# Patient Record
Sex: FEMALE | Race: BLACK OR AFRICAN AMERICAN | NOT HISPANIC OR LATINO | Employment: OTHER | URBAN - METROPOLITAN AREA
[De-identification: names, ages, dates, MRNs, and addresses within clinical notes are randomized per-mention and may not be internally consistent; named-entity substitution may affect disease eponyms.]

---

## 2017-10-05 ENCOUNTER — TRANSCRIBE ORDERS (OUTPATIENT)
Dept: ADMINISTRATIVE | Facility: HOSPITAL | Age: 57
End: 2017-10-05

## 2017-10-05 DIAGNOSIS — Z12.31 VISIT FOR SCREENING MAMMOGRAM: Primary | ICD-10-CM

## 2017-11-10 ENCOUNTER — HOSPITAL ENCOUNTER (OUTPATIENT)
Dept: MAMMOGRAPHY | Facility: HOSPITAL | Age: 57
Discharge: HOME | End: 2017-11-10
Admitting: NURSE PRACTITIONER

## 2017-11-10 DIAGNOSIS — Z12.31 VISIT FOR SCREENING MAMMOGRAM: ICD-10-CM

## 2017-11-10 PROCEDURE — 77063 BREAST TOMOSYNTHESIS BI: CPT | Performed by: RADIOLOGY

## 2017-11-10 PROCEDURE — 77063 BREAST TOMOSYNTHESIS BI: CPT

## 2017-11-10 PROCEDURE — G0202 SCR MAMMO BI INCL CAD: HCPCS

## 2017-11-10 PROCEDURE — 77067 SCR MAMMO BI INCL CAD: CPT | Performed by: RADIOLOGY

## 2018-10-04 ENCOUNTER — TRANSCRIBE ORDERS (OUTPATIENT)
Dept: ADMINISTRATIVE | Facility: HOSPITAL | Age: 58
End: 2018-10-04

## 2018-10-04 DIAGNOSIS — Z12.31 VISIT FOR SCREENING MAMMOGRAM: Primary | ICD-10-CM

## 2018-11-12 ENCOUNTER — HOSPITAL ENCOUNTER (OUTPATIENT)
Dept: MAMMOGRAPHY | Facility: HOSPITAL | Age: 58
Discharge: HOME | End: 2018-11-12
Admitting: INTERNAL MEDICINE

## 2018-11-12 DIAGNOSIS — Z12.31 VISIT FOR SCREENING MAMMOGRAM: ICD-10-CM

## 2018-11-12 PROCEDURE — 77063 BREAST TOMOSYNTHESIS BI: CPT

## 2018-11-12 PROCEDURE — 77067 SCR MAMMO BI INCL CAD: CPT | Performed by: RADIOLOGY

## 2018-11-12 PROCEDURE — 77063 BREAST TOMOSYNTHESIS BI: CPT | Performed by: RADIOLOGY

## 2018-11-12 PROCEDURE — 77067 SCR MAMMO BI INCL CAD: CPT

## 2019-01-28 ENCOUNTER — TRANSCRIBE ORDERS (OUTPATIENT)
Dept: NUTRITION | Facility: HOSPITAL | Age: 59
End: 2019-01-28

## 2019-01-28 DIAGNOSIS — E66.09 OBESITY DUE TO EXCESS CALORIES, UNSPECIFIED CLASSIFICATION, UNSPECIFIED WHETHER SERIOUS COMORBIDITY PRESENT: Primary | ICD-10-CM

## 2019-01-28 DIAGNOSIS — E11.9 DIABETES MELLITUS WITHOUT COMPLICATION (HCC): ICD-10-CM

## 2019-02-12 ENCOUNTER — HOSPITAL ENCOUNTER (OUTPATIENT)
Dept: NUTRITION | Facility: HOSPITAL | Age: 59
Setting detail: RECURRING SERIES
Discharge: HOME | End: 2019-02-12

## 2019-02-12 PROCEDURE — G0108 DIAB MANAGE TRN  PER INDIV: HCPCS | Performed by: DIETITIAN, REGISTERED

## 2019-03-13 ENCOUNTER — APPOINTMENT (OUTPATIENT)
Dept: NUTRITION | Facility: HOSPITAL | Age: 59
End: 2019-03-13

## 2019-03-22 ENCOUNTER — APPOINTMENT (OUTPATIENT)
Dept: NUTRITION | Facility: HOSPITAL | Age: 59
End: 2019-03-22

## 2020-07-01 ENCOUNTER — TRANSCRIBE ORDERS (OUTPATIENT)
Dept: ADMINISTRATIVE | Facility: HOSPITAL | Age: 60
End: 2020-07-01

## 2020-07-01 DIAGNOSIS — Z12.31 VISIT FOR SCREENING MAMMOGRAM: Primary | ICD-10-CM

## 2020-09-21 ENCOUNTER — HOSPITAL ENCOUNTER (OUTPATIENT)
Dept: MAMMOGRAPHY | Facility: HOSPITAL | Age: 60
Discharge: HOME | End: 2020-09-21
Admitting: NURSE PRACTITIONER

## 2020-09-21 DIAGNOSIS — Z12.31 VISIT FOR SCREENING MAMMOGRAM: ICD-10-CM

## 2020-09-21 PROCEDURE — 77063 BREAST TOMOSYNTHESIS BI: CPT | Performed by: RADIOLOGY

## 2020-09-21 PROCEDURE — 77067 SCR MAMMO BI INCL CAD: CPT

## 2020-09-21 PROCEDURE — 77063 BREAST TOMOSYNTHESIS BI: CPT

## 2020-09-21 PROCEDURE — 77067 SCR MAMMO BI INCL CAD: CPT | Performed by: RADIOLOGY

## 2021-04-26 ENCOUNTER — TRANSCRIBE ORDERS (OUTPATIENT)
Dept: ADMINISTRATIVE | Facility: HOSPITAL | Age: 61
End: 2021-04-26

## 2021-04-26 ENCOUNTER — HOSPITAL ENCOUNTER (OUTPATIENT)
Dept: GENERAL RADIOLOGY | Facility: HOSPITAL | Age: 61
Discharge: HOME | End: 2021-04-26
Admitting: INTERNAL MEDICINE

## 2021-04-26 DIAGNOSIS — M25.512 LEFT SHOULDER PAIN, UNSPECIFIED CHRONICITY: Primary | ICD-10-CM

## 2021-04-26 PROCEDURE — 73030 X-RAY EXAM OF SHOULDER: CPT

## 2021-05-24 ENCOUNTER — OFFICE VISIT (OUTPATIENT)
Dept: ORTHOPEDIC SURGERY | Facility: CLINIC | Age: 61
End: 2021-05-24

## 2021-05-24 VITALS
DIASTOLIC BLOOD PRESSURE: 82 MMHG | BODY MASS INDEX: 35.61 KG/M2 | HEIGHT: 68 IN | HEART RATE: 76 BPM | SYSTOLIC BLOOD PRESSURE: 134 MMHG | WEIGHT: 235 LBS

## 2021-05-24 DIAGNOSIS — M19.012 PRIMARY OSTEOARTHRITIS OF LEFT SHOULDER: Primary | ICD-10-CM

## 2021-05-24 PROCEDURE — 99204 OFFICE O/P NEW MOD 45 MIN: CPT | Performed by: ORTHOPAEDIC SURGERY

## 2021-05-24 RX ORDER — MESALAMINE 375 MG/1
CAPSULE, EXTENDED RELEASE ORAL
COMMUNITY
Start: 2021-05-03

## 2021-05-24 RX ORDER — INSULIN LISPRO 100 [IU]/ML
INJECTION, SOLUTION INTRAVENOUS; SUBCUTANEOUS
COMMUNITY
Start: 2021-05-09

## 2021-05-24 RX ORDER — SIMVASTATIN 40 MG
TABLET ORAL
COMMUNITY
Start: 2021-04-30

## 2021-05-24 RX ORDER — HYDROCODONE BITARTRATE AND ACETAMINOPHEN 10; 325 MG/1; MG/1
TABLET ORAL
COMMUNITY
Start: 2021-04-26

## 2021-05-24 RX ORDER — AMLODIPINE BESYLATE 5 MG/1
TABLET ORAL
COMMUNITY
Start: 2021-04-29

## 2021-05-24 RX ORDER — METHOCARBAMOL 750 MG/1
TABLET, FILM COATED ORAL
COMMUNITY
Start: 2021-04-15

## 2021-05-24 RX ORDER — TRIAMCINOLONE ACETONIDE 0.25 MG/G
CREAM TOPICAL 2 TIMES DAILY
COMMUNITY

## 2021-05-24 RX ORDER — METOPROLOL TARTRATE 100 MG/1
TABLET ORAL
COMMUNITY
Start: 2021-05-03

## 2021-05-24 RX ORDER — PREGABALIN 150 MG/1
CAPSULE ORAL
COMMUNITY
Start: 2021-05-07

## 2021-05-24 RX ORDER — DONEPEZIL HYDROCHLORIDE 10 MG/1
TABLET, FILM COATED ORAL
COMMUNITY
Start: 2021-04-05

## 2021-05-24 RX ORDER — EXENATIDE 2 MG/.65ML
INJECTION, SUSPENSION, EXTENDED RELEASE SUBCUTANEOUS
COMMUNITY
Start: 2021-03-16 | End: 2021-10-19

## 2021-05-24 RX ORDER — FLUCONAZOLE 150 MG/1
TABLET ORAL
COMMUNITY
Start: 2021-05-03

## 2021-05-24 RX ORDER — DULOXETIN HYDROCHLORIDE 60 MG/1
CAPSULE, DELAYED RELEASE ORAL
COMMUNITY
Start: 2021-05-03

## 2021-05-24 RX ORDER — BUSPIRONE HYDROCHLORIDE 10 MG/1
TABLET ORAL
COMMUNITY
Start: 2021-04-03

## 2021-05-24 RX ORDER — ESTRADIOL 0.1 MG/D
FILM, EXTENDED RELEASE TRANSDERMAL
COMMUNITY
Start: 2021-04-15

## 2021-05-24 RX ORDER — ASPIRIN 81 MG/1
81 TABLET ORAL DAILY
COMMUNITY

## 2021-05-24 RX ORDER — OMEPRAZOLE 40 MG/1
CAPSULE, DELAYED RELEASE ORAL
COMMUNITY
Start: 2021-05-03

## 2021-05-24 RX ORDER — YOHIMBE BARK 500 MG
350 CAPSULE ORAL
COMMUNITY

## 2021-05-24 RX ORDER — INSULIN GLARGINE 100 [IU]/ML
INJECTION, SOLUTION SUBCUTANEOUS
COMMUNITY
Start: 2021-03-10

## 2021-05-24 NOTE — PROGRESS NOTES
"    [facility] Note    Subjective     Chief Complaint   Patient presents with   • Left Shoulder - Pain        HPI    Lizeth Johns is a 61 y.o. female who presents with new problem of     The patient is right-hand dominant. The patient reports intermittent left shoulder pain for approximately 2 years and she is currently in pain today. She localizes her pain to the posterior aspect to the anterior aspect of the shoulder. She denies any known injury and reports pain with lifting and lying on her left side with intermittent stiffness. She also reports pain as she is currently sitting her and it feels as if it has a \"pull\" in it. She denies currently having any numbness or tingling in her left upper extremity.     The patient has tried a muscle relaxer and hydrocodone secondary to her lower back issues, which does not seem to alleviate any of her shoulder pain. She is currently attending physical therapy, which provides relief until she leaves physical therapy for 1 hour. The patient denies any previous injections in her left shoulder.    I have reviewed the following portions of the patient's history and agree with: History of Present Illness and Review of Systems    There is no problem list on file for this patient.    Past Medical History:   Diagnosis Date   • Diabetes (CMS/HCC)    • Fibromyalgia    • Hypertension    • Osteoarthritis       Past Surgical History:   Procedure Laterality Date   • BACK SURGERY     • BLADDER SURGERY     • CATARACT EXTRACTION     • CYST REMOVAL     • ENDOMETRIAL ABLATION     • GALLBLADDER SURGERY        Family History   Problem Relation Age of Onset   • Ovarian cancer Sister         DX AGE UNKNOWN   • Ovarian cancer Maternal Aunt         DX AGE UNKNOWN   • Ovarian cancer Mother    • Breast cancer Neg Hx      Social History     Socioeconomic History   • Marital status:      Spouse name: Not on file   • Number of children: Not on file   • Years of education: " Not on file   • Highest education level: Not on file   Tobacco Use   • Smoking status: Former Smoker     Types: Cigarettes   • Smokeless tobacco: Never Used   Substance and Sexual Activity   • Alcohol use: Never   • Drug use: Never   • Sexual activity: Defer      Current Outpatient Medications on File Prior to Visit   Medication Sig Dispense Refill   • amLODIPine (NORVASC) 5 MG tablet      • Apriso 0.375 g 24 hr capsule      • aspirin 81 MG EC tablet Take 81 mg by mouth Daily.     • busPIRone (BUSPAR) 10 MG tablet      • Bydureon 2 MG pen-injector injection      • Diclofenac Sodium (VOLTAREN) 1 % gel gel      • donepezil (ARICEPT) 10 MG tablet      • DULoxetine (CYMBALTA) 60 MG capsule      • estradiol (VIVELLE-DOT) 0.1 MG/24HR patch      • HumaLOG KwikPen 100 UNIT/ML solution pen-injector      • HYDROcodone-acetaminophen (NORCO)  MG per tablet      • Insulin Glargine (BASAGLAR KWIKPEN) 100 UNIT/ML injection pen      • Lactobacillus (Acidophilus) 100 MG capsule Take 350 mg by mouth.     • magnesium oxide (MAGOX) 400 (241.3 Mg) MG tablet tablet      • methocarbamol (ROBAXIN) 750 MG tablet      • metoprolol tartrate (LOPRESSOR) 100 MG tablet      • omeprazole (priLOSEC) 40 MG capsule      • pregabalin (LYRICA) 150 MG capsule      • progesterone (PROMETRIUM) 100 MG capsule      • simvastatin (ZOCOR) 40 MG tablet      • triamcinolone (KENALOG) 0.025 % cream Apply  topically to the appropriate area as directed 2 (Two) Times a Day.     • fluconazole (DIFLUCAN) 150 MG tablet        No current facility-administered medications on file prior to visit.      Allergies   Allergen Reactions   • Bactrim [Sulfamethoxazole-Trimethoprim] Hives   • Flavoxate Hives   • Lisinopril Swelling   • Phenazopyridine Hives        Review of Systems   Constitutional: Negative.  Negative for activity change, appetite change, chills, diaphoresis, fatigue, fever and unexpected weight change.   HENT: Negative.  Negative for congestion, dental  "problem, drooling, ear discharge, ear pain, facial swelling, hearing loss, mouth sores, nosebleeds, postnasal drip, rhinorrhea, sinus pressure, sneezing, sore throat, tinnitus, trouble swallowing and voice change.    Eyes: Negative.  Negative for photophobia, pain, discharge, redness, itching and visual disturbance.   Respiratory: Negative.  Negative for apnea, cough, choking, chest tightness, shortness of breath, wheezing and stridor.    Cardiovascular: Negative.  Negative for chest pain, palpitations and leg swelling.   Gastrointestinal: Negative.  Negative for abdominal distention, abdominal pain, anal bleeding, blood in stool, constipation, diarrhea, nausea, rectal pain and vomiting.   Endocrine: Negative.  Negative for cold intolerance, heat intolerance, polydipsia, polyphagia and polyuria.   Genitourinary: Negative.  Negative for decreased urine volume, difficulty urinating, dysuria, enuresis, flank pain, frequency, genital sores, hematuria and urgency.   Musculoskeletal: Positive for arthralgias. Negative for back pain, gait problem, joint swelling, myalgias, neck pain and neck stiffness.   Skin: Negative.  Negative for color change, pallor, rash and wound.   Allergic/Immunologic: Negative.  Negative for environmental allergies, food allergies and immunocompromised state.   Neurological: Negative.  Negative for dizziness, tremors, seizures, syncope, facial asymmetry, speech difficulty, weakness, light-headedness, numbness and headaches.   Hematological: Negative.  Negative for adenopathy. Does not bruise/bleed easily.   Psychiatric/Behavioral: Negative.  Negative for agitation, behavioral problems, confusion, decreased concentration, dysphoric mood, hallucinations, self-injury, sleep disturbance and suicidal ideas. The patient is not nervous/anxious and is not hyperactive.         Objective      Physical Exam  /82   Pulse 76   Ht 172.7 cm (68\")   Wt 107 kg (235 lb)   BMI 35.73 kg/m²     Body mass " index is 35.73 kg/m².    General:   Mental Status:  Alert   Appearance: Cooperative, in no acute distress   Build and Nutrition: Obese by BMI female   Orientation: Alert and oriented to person, place and time   Posture: Normal   Gait: Normal    Integument  • Left shoulder: No skin lesions, rash, or ecchymosis.    Neurologic  • Sensation:  • Left hand: Intact to light touch in the digits    Motor  • Left upper extremity: 5/5 deltoid, biceps, triceps, wrist flexors, wrist extensors, and interossei    Vascular  • Left upper extremity: 2+ radial pulse. Prompt capillary refill.    Upper Extremities  • Left Shoulder:  • Tenderness: None  • Swelling: None  • Crepitus: None  • Atrophy: None  • Range of motion:  • External rotation: 80°  • Forward flexion: 170°  • Abduction: 120°  • Instability: None  • Deformities: None  • Functional Testing:  • Drop Arm: Negative  • Lift off: Negative.  • Impingement: Positive    Imaging/Studies    XR Shoulder 2+ View Left (04/26/2021 11:53)    I personally reviewed the patient's left shoulder radiographs that were performed from 04/26/2020. These demonstrated signs of glenohumeral degeneration. No acute bony abnormalities.    Imaging Results (Last 24 Hours)     ** No results found for the last 24 hours. **          Assessment and Plan     Diagnoses and all orders for this visit:    1. Primary osteoarthritis of left shoulder (Primary)  -     MRI Shoulder Left Without Contrast; Future        1. Primary osteoarthritis of left shoulder        I reviewed my findings with the patient today.  Her shoulder has not responded to medication and physical therapy, therefore I am going to order an MRI of the left shoulder for further evaluation.  I will see her back after the MRI to review the findings and make further recommendations and discuss treatment options.      Return for After Imaging Study.      Scribed for Jerome Egan MD by Vineet Greene.  05/24/21   11:02 EDT    I have personally  performed the services described in this document as scribed by the above individual, and it is both accurate and complete.  Jerome Egan MD  5/24/2021  12:38 EDT

## 2021-06-25 ENCOUNTER — TRANSCRIBE ORDERS (OUTPATIENT)
Dept: ADMINISTRATIVE | Facility: HOSPITAL | Age: 61
End: 2021-06-25

## 2021-06-25 DIAGNOSIS — Z12.31 VISIT FOR SCREENING MAMMOGRAM: Primary | ICD-10-CM

## 2021-07-20 ENCOUNTER — HOSPITAL ENCOUNTER (OUTPATIENT)
Dept: MRI IMAGING | Facility: HOSPITAL | Age: 61
Discharge: HOME | End: 2021-07-20

## 2021-07-20 DIAGNOSIS — M19.012 PRIMARY OSTEOARTHRITIS OF LEFT SHOULDER: ICD-10-CM

## 2021-07-22 ENCOUNTER — TELEPHONE (OUTPATIENT)
Dept: ORTHOPEDIC SURGERY | Facility: CLINIC | Age: 61
End: 2021-07-22

## 2021-07-22 DIAGNOSIS — M19.012 PRIMARY OSTEOARTHRITIS OF LEFT SHOULDER: Primary | ICD-10-CM

## 2021-08-17 ENCOUNTER — HOSPITAL ENCOUNTER (OUTPATIENT)
Dept: GENERAL RADIOLOGY | Facility: HOSPITAL | Age: 61
Discharge: HOME | End: 2021-08-17

## 2021-08-17 ENCOUNTER — HOSPITAL ENCOUNTER (OUTPATIENT)
Dept: MRI IMAGING | Facility: HOSPITAL | Age: 61
End: 2021-08-17

## 2021-08-17 ENCOUNTER — HOSPITAL ENCOUNTER (OUTPATIENT)
Dept: CT IMAGING | Facility: HOSPITAL | Age: 61
Discharge: HOME | End: 2021-08-17

## 2021-08-17 DIAGNOSIS — M19.012 PRIMARY OSTEOARTHRITIS OF LEFT SHOULDER: ICD-10-CM

## 2021-08-17 PROCEDURE — 77002 NEEDLE LOCALIZATION BY XRAY: CPT

## 2021-08-17 PROCEDURE — 25010000002 IOPAMIDOL 61 % SOLUTION: Performed by: ORTHOPAEDIC SURGERY

## 2021-08-17 PROCEDURE — 73201 CT UPPER EXTREMITY W/DYE: CPT

## 2021-08-17 RX ORDER — LIDOCAINE HYDROCHLORIDE 10 MG/ML
5 INJECTION, SOLUTION EPIDURAL; INFILTRATION; INTRACAUDAL; PERINEURAL ONCE
Status: COMPLETED | OUTPATIENT
Start: 2021-08-17 | End: 2021-08-17

## 2021-08-17 RX ADMIN — LIDOCAINE HYDROCHLORIDE 5 ML: 10 INJECTION, SOLUTION EPIDURAL; INFILTRATION; INTRACAUDAL; PERINEURAL at 14:05

## 2021-08-17 RX ADMIN — IOPAMIDOL 20 ML: 612 INJECTION, SOLUTION INTRAVENOUS at 14:05

## 2021-08-23 ENCOUNTER — OFFICE VISIT (OUTPATIENT)
Dept: ORTHOPEDIC SURGERY | Facility: CLINIC | Age: 61
End: 2021-08-23

## 2021-08-23 VITALS
DIASTOLIC BLOOD PRESSURE: 96 MMHG | BODY MASS INDEX: 34.36 KG/M2 | WEIGHT: 232 LBS | HEART RATE: 78 BPM | SYSTOLIC BLOOD PRESSURE: 143 MMHG | HEIGHT: 69 IN

## 2021-08-23 DIAGNOSIS — M19.012 PRIMARY OSTEOARTHRITIS OF LEFT SHOULDER: Primary | ICD-10-CM

## 2021-08-23 PROCEDURE — 99214 OFFICE O/P EST MOD 30 MIN: CPT | Performed by: ORTHOPAEDIC SURGERY

## 2021-08-23 NOTE — PROGRESS NOTES
[facility]    Subjective     Chief Complaint   Patient presents with   • Left Shoulder - Follow-up     3 month f/u, Post CT Arthrogram 08/17/21        HPI    Lizeth Johns is a 61 y.o. female who follows up for left shoulder. She had a CT arthrogram and is here today for the results. She has been having problems with the shoulder for 2 years now, though more noticeably within the last year. She has tried physical therapy, which helped only during her sessions.    The patient reports her left shoulder pain has been worsening since her last visit. She is in a significant amount of pain and discomfort and has tingling in her hands. She has not had any injections in her shoulder previously.      I have reviewed the following portions of the patient's history and agree with: History of Present Illness and Review of Systems    There is no problem list on file for this patient.    Past Medical History:   Diagnosis Date   • Diabetes (CMS/HCC)    • Fibromyalgia    • Hypertension    • Osteoarthritis       Past Surgical History:   Procedure Laterality Date   • BACK SURGERY     • BLADDER SURGERY     • CATARACT EXTRACTION     • CYST REMOVAL     • ENDOMETRIAL ABLATION     • GALLBLADDER SURGERY        Family History   Problem Relation Age of Onset   • Ovarian cancer Sister         DX AGE UNKNOWN   • Ovarian cancer Maternal Aunt         DX AGE UNKNOWN   • Ovarian cancer Mother    • Breast cancer Neg Hx      Social History     Socioeconomic History   • Marital status:      Spouse name: Not on file   • Number of children: Not on file   • Years of education: Not on file   • Highest education level: Not on file   Tobacco Use   • Smoking status: Former Smoker     Types: Cigarettes   • Smokeless tobacco: Never Used   Substance and Sexual Activity   • Alcohol use: Never   • Drug use: Never   • Sexual activity: Defer      Current Outpatient Medications on File Prior to Visit   Medication Sig Dispense  Refill   • amLODIPine (NORVASC) 5 MG tablet      • Apriso 0.375 g 24 hr capsule      • aspirin 81 MG EC tablet Take 81 mg by mouth Daily.     • busPIRone (BUSPAR) 10 MG tablet      • Bydureon 2 MG pen-injector injection      • Diclofenac Sodium (VOLTAREN) 1 % gel gel      • donepezil (ARICEPT) 10 MG tablet      • DULoxetine (CYMBALTA) 60 MG capsule      • estradiol (VIVELLE-DOT) 0.1 MG/24HR patch      • fluconazole (DIFLUCAN) 150 MG tablet      • HumaLOG KwikPen 100 UNIT/ML solution pen-injector      • HYDROcodone-acetaminophen (NORCO)  MG per tablet      • Insulin Glargine (BASAGLAR KWIKPEN) 100 UNIT/ML injection pen      • Lactobacillus (Acidophilus) 100 MG capsule Take 350 mg by mouth.     • magnesium oxide (MAGOX) 400 (241.3 Mg) MG tablet tablet      • methocarbamol (ROBAXIN) 750 MG tablet      • metoprolol tartrate (LOPRESSOR) 100 MG tablet      • omeprazole (priLOSEC) 40 MG capsule      • pregabalin (LYRICA) 150 MG capsule      • progesterone (PROMETRIUM) 100 MG capsule      • simvastatin (ZOCOR) 40 MG tablet      • triamcinolone (KENALOG) 0.025 % cream Apply  topically to the appropriate area as directed 2 (Two) Times a Day.       No current facility-administered medications on file prior to visit.      Allergies   Allergen Reactions   • Bactrim [Sulfamethoxazole-Trimethoprim] Hives   • Flavoxate Hives   • Lisinopril Swelling   • Phenazopyridine Hives        Review of Systems   Constitutional: Negative.  Negative for activity change, appetite change, chills, diaphoresis, fatigue, fever and unexpected weight change.   HENT: Negative.  Negative for congestion, dental problem, drooling, ear discharge, ear pain, facial swelling, hearing loss, mouth sores, nosebleeds, postnasal drip, rhinorrhea, sinus pressure, sneezing, sore throat, tinnitus, trouble swallowing and voice change.    Eyes: Negative.  Negative for photophobia, pain, discharge, redness, itching and visual disturbance.   Respiratory: Negative.   "Negative for apnea, cough, choking, chest tightness, shortness of breath, wheezing and stridor.    Cardiovascular: Negative.  Negative for chest pain, palpitations and leg swelling.   Gastrointestinal: Negative.  Negative for abdominal distention, abdominal pain, anal bleeding, blood in stool, constipation, diarrhea, nausea, rectal pain and vomiting.   Endocrine: Negative.  Negative for cold intolerance, heat intolerance, polydipsia, polyphagia and polyuria.   Genitourinary: Negative.  Negative for decreased urine volume, difficulty urinating, dysuria, enuresis, flank pain, frequency, genital sores, hematuria and urgency.   Musculoskeletal: Positive for arthralgias. Negative for back pain, gait problem, joint swelling, myalgias, neck pain and neck stiffness.   Skin: Negative.  Negative for color change, pallor, rash and wound.   Allergic/Immunologic: Negative.  Negative for environmental allergies, food allergies and immunocompromised state.   Neurological: Negative.  Negative for dizziness, tremors, seizures, syncope, facial asymmetry, speech difficulty, weakness, light-headedness, numbness and headaches.   Hematological: Negative.  Negative for adenopathy. Does not bruise/bleed easily.   Psychiatric/Behavioral: Negative.  Negative for agitation, behavioral problems, confusion, decreased concentration, dysphoric mood, hallucinations, self-injury, sleep disturbance and suicidal ideas. The patient is not nervous/anxious and is not hyperactive.         Objective      Physical Exam  /96   Pulse 78   Ht 175.3 cm (69.02\")   Wt 105 kg (232 lb)   BMI 34.24 kg/m²     Body mass index is 34.24 kg/m².    General:   Mental Status:  Alert   Appearance: Cooperative, in no acute distress   Build and Nutrition: Obese by BMI female   Orientation: Alert and oriented to person, place and time   Posture: Normal   Gait: Normal    Integument:  • Left shoulder: No skin lesions, rash, or ecchymosis.    Upper Extremities  • Left " Shoulder:  • Tenderness: None  • Swelling: None  • Range of motion:  • External rotation: 70°  • Forward flexion: 100°  • Abduction: 80°  • Forward elevation: active to 100°, passive to 120°  • Deformities: None  • Functional Testing:  • Drop Arm: Negative  • Lift off: Negative.  • Impingement:    Imaging/Studies  Imaging Results (Last 24 Hours)     ** No results found for the last 24 hours. **            Assessment and Plan     Diagnoses and all orders for this visit:    1. Primary osteoarthritis of left shoulder (Primary)        1. Primary osteoarthritis of left shoulder        I reviewed the results of the left shoulder CT arthrogram with the patient, which showed no issue with the rotator cuff.  She does have arthritis and a potential labral tear, per the report. If she does have a labral tear, it is very small and would not likely necessitate surgery. She has tried physical therapy without much relief, so I am going to refer her to Dr. Coker for further evaluation and treatment of her left shoulder.    Return for Referral to Dr. Coker.      Transcribed from ambient dictation for Jerome Egan MD by Doretha Fernandez.  08/23/21   14:53 EDT    I have personally performed the services described in this document as transcribed by the above individual, and it is both accurate and complete.  Jerome Egan MD  8/24/2021  09:21 EDT

## 2021-08-30 ENCOUNTER — OFFICE VISIT (OUTPATIENT)
Dept: ORTHOPEDIC SURGERY | Facility: CLINIC | Age: 61
End: 2021-08-30

## 2021-08-30 VITALS
SYSTOLIC BLOOD PRESSURE: 144 MMHG | DIASTOLIC BLOOD PRESSURE: 87 MMHG | WEIGHT: 231.48 LBS | HEART RATE: 79 BPM | BODY MASS INDEX: 34.29 KG/M2 | HEIGHT: 69 IN

## 2021-08-30 DIAGNOSIS — M25.551 RIGHT HIP PAIN: Primary | ICD-10-CM

## 2021-08-30 DIAGNOSIS — M16.11 PRIMARY OSTEOARTHRITIS OF RIGHT HIP: ICD-10-CM

## 2021-08-30 DIAGNOSIS — E66.09 CLASS 1 OBESITY DUE TO EXCESS CALORIES WITHOUT SERIOUS COMORBIDITY WITH BODY MASS INDEX (BMI) OF 34.0 TO 34.9 IN ADULT: ICD-10-CM

## 2021-08-30 DIAGNOSIS — M70.61 TROCHANTERIC BURSITIS OF RIGHT HIP: ICD-10-CM

## 2021-08-30 PROCEDURE — 99214 OFFICE O/P EST MOD 30 MIN: CPT | Performed by: PHYSICIAN ASSISTANT

## 2021-08-30 PROCEDURE — 20610 DRAIN/INJ JOINT/BURSA W/O US: CPT | Performed by: PHYSICIAN ASSISTANT

## 2021-08-30 RX ADMIN — LIDOCAINE HYDROCHLORIDE 4 ML: 10 INJECTION, SOLUTION EPIDURAL; INFILTRATION; INTRACAUDAL; PERINEURAL at 12:11

## 2021-08-30 RX ADMIN — TRIAMCINOLONE ACETONIDE 40 MG: 40 INJECTION, SUSPENSION INTRA-ARTICULAR; INTRAMUSCULAR at 12:11

## 2021-08-31 ENCOUNTER — OFFICE VISIT (OUTPATIENT)
Dept: ORTHOPEDIC SURGERY | Facility: CLINIC | Age: 61
End: 2021-08-31

## 2021-08-31 VITALS
DIASTOLIC BLOOD PRESSURE: 94 MMHG | HEIGHT: 69 IN | BODY MASS INDEX: 34.29 KG/M2 | HEART RATE: 85 BPM | SYSTOLIC BLOOD PRESSURE: 151 MMHG | WEIGHT: 231.48 LBS

## 2021-08-31 DIAGNOSIS — M75.22 BICEPS TENDINITIS OF LEFT UPPER EXTREMITY: Primary | ICD-10-CM

## 2021-08-31 DIAGNOSIS — M75.52 BURSITIS OF LEFT SHOULDER: ICD-10-CM

## 2021-08-31 DIAGNOSIS — M75.42 IMPINGEMENT SYNDROME OF LEFT SHOULDER: ICD-10-CM

## 2021-08-31 PROCEDURE — 99214 OFFICE O/P EST MOD 30 MIN: CPT | Performed by: ORTHOPAEDIC SURGERY

## 2021-08-31 PROCEDURE — 20550 NJX 1 TENDON SHEATH/LIGAMENT: CPT | Performed by: ORTHOPAEDIC SURGERY

## 2021-08-31 RX ORDER — LIDOCAINE HYDROCHLORIDE 10 MG/ML
5 INJECTION, SOLUTION EPIDURAL; INFILTRATION; INTRACAUDAL; PERINEURAL
Status: COMPLETED | OUTPATIENT
Start: 2021-08-31 | End: 2021-08-31

## 2021-08-31 RX ORDER — METHYLPREDNISOLONE ACETATE 80 MG/ML
80 INJECTION, SUSPENSION INTRA-ARTICULAR; INTRALESIONAL; INTRAMUSCULAR; SOFT TISSUE
Status: COMPLETED | OUTPATIENT
Start: 2021-08-31 | End: 2021-08-31

## 2021-08-31 RX ADMIN — METHYLPREDNISOLONE ACETATE 80 MG: 80 INJECTION, SUSPENSION INTRA-ARTICULAR; INTRALESIONAL; INTRAMUSCULAR; SOFT TISSUE at 11:48

## 2021-08-31 RX ADMIN — LIDOCAINE HYDROCHLORIDE 5 ML: 10 INJECTION, SOLUTION EPIDURAL; INFILTRATION; INTRACAUDAL; PERINEURAL at 11:48

## 2021-08-31 NOTE — PROGRESS NOTES
"                                                                    [facility] Office Visit - Juan Coker MD    Office Visit       Patient Name: Lizeth Johns    Chief Complaint:   Chief Complaint   Patient presents with   • Left Shoulder - Pain     Referral from Dr. Egan for Primary osteoarthritis of left shoulder        Referring Physician: No ref. provider found    History of Present Illness:   Lizeth Johns is a 61 y.o. female who presents with left body part: shoulder Reason: pain.  Onset:Onset: atraumatic and gradual in nature. The issue has been ongoing for 2+ year(s). Pain is a 7/10 on the pain scale. Pain is described as Pain Characterization: aching, throbbing and stabbing. Associated symptoms include Symptoms: pain and popping. The pain is worse with lying on affected side and any movement of the joint; ice improve the pain. Previous treatments have included: NSAIDS and physical therapy. I have reviewed the patient's history of present illness as noted/entered above.    I have reviewed the patient's past medical history, surgical history, social history, family history, medications, and allergies as noted in the electronic medical record and as noted/entered.  I have reviewed the patient's review of systems as noted/enter and updated as noted in the patient's HPI.      LEFT SHOULDER  Pain persists she notes  PT at [facility]'s physical therapy for several weeks - \"worked while I was there\" but still hurt at home  I reviewed her CT arthrogram and counseled    Enjoys: walking, fitness room, swimming    Anterior biceps pain    Disabled      Subjective   Subjective      Review of Systems   Constitutional: Negative.  Negative for chills, fatigue and fever.   HENT: Negative.  Negative for congestion and dental problem.    Eyes: Negative.  Negative for blurred vision.   Respiratory: Negative.  Negative for shortness of breath.    Cardiovascular: Negative.  Negative for leg swelling. "   Gastrointestinal: Negative.  Negative for abdominal pain.   Endocrine: Negative.  Negative for polyuria.   Genitourinary: Negative.  Negative for difficulty urinating.   Musculoskeletal: Positive for arthralgias.   Skin: Negative.    Allergic/Immunologic: Negative.    Neurological: Negative.    Hematological: Negative.  Negative for adenopathy.   Psychiatric/Behavioral: Negative.  Negative for behavioral problems.        Past Medical History:   Past Medical History:   Diagnosis Date   • Diabetes (CMS/HCC)    • Fibromyalgia    • Hypertension    • Osteoarthritis        Past Surgical History:   Past Surgical History:   Procedure Laterality Date   • BACK SURGERY     • BLADDER SURGERY     • CATARACT EXTRACTION     • CYST REMOVAL     • ENDOMETRIAL ABLATION     • GALLBLADDER SURGERY         Family History:   Family History   Problem Relation Age of Onset   • Ovarian cancer Sister         DX AGE UNKNOWN   • Ovarian cancer Maternal Aunt         DX AGE UNKNOWN   • Ovarian cancer Mother    • Breast cancer Neg Hx        Social History:   Social History     Socioeconomic History   • Marital status:      Spouse name: Not on file   • Number of children: Not on file   • Years of education: Not on file   • Highest education level: Not on file   Tobacco Use   • Smoking status: Former Smoker     Types: Cigarettes   • Smokeless tobacco: Never Used   Substance and Sexual Activity   • Alcohol use: Never   • Drug use: Never   • Sexual activity: Defer       Medications:   Current Outpatient Medications:   •  amLODIPine (NORVASC) 5 MG tablet, , Disp: , Rfl:   •  Apriso 0.375 g 24 hr capsule, , Disp: , Rfl:   •  aspirin 81 MG EC tablet, Take 81 mg by mouth Daily., Disp: , Rfl:   •  busPIRone (BUSPAR) 10 MG tablet, , Disp: , Rfl:   •  Bydureon 2 MG pen-injector injection, , Disp: , Rfl:   •  Diclofenac Sodium (VOLTAREN) 1 % gel gel, , Disp: , Rfl:   •  donepezil (ARICEPT) 10 MG tablet, , Disp: , Rfl:   •  DULoxetine (CYMBALTA) 60 MG  "capsule, , Disp: , Rfl:   •  estradiol (VIVELLE-DOT) 0.1 MG/24HR patch, , Disp: , Rfl:   •  fluconazole (DIFLUCAN) 150 MG tablet, , Disp: , Rfl:   •  HumaLOG KwikPen 100 UNIT/ML solution pen-injector, , Disp: , Rfl:   •  HYDROcodone-acetaminophen (NORCO)  MG per tablet, , Disp: , Rfl:   •  Insulin Glargine (BASAGLAR KWIKPEN) 100 UNIT/ML injection pen, , Disp: , Rfl:   •  Lactobacillus (Acidophilus) 100 MG capsule, Take 350 mg by mouth., Disp: , Rfl:   •  magnesium oxide (MAGOX) 400 (241.3 Mg) MG tablet tablet, , Disp: , Rfl:   •  methocarbamol (ROBAXIN) 750 MG tablet, , Disp: , Rfl:   •  metoprolol tartrate (LOPRESSOR) 100 MG tablet, , Disp: , Rfl:   •  omeprazole (priLOSEC) 40 MG capsule, , Disp: , Rfl:   •  pregabalin (LYRICA) 150 MG capsule, , Disp: , Rfl:   •  progesterone (PROMETRIUM) 100 MG capsule, , Disp: , Rfl:   •  simvastatin (ZOCOR) 40 MG tablet, , Disp: , Rfl:   •  triamcinolone (KENALOG) 0.025 % cream, Apply  topically to the appropriate area as directed 2 (Two) Times a Day., Disp: , Rfl:     Allergies:   Allergies   Allergen Reactions   • Bactrim [Sulfamethoxazole-Trimethoprim] Hives   • Flavoxate Hives   • Lisinopril Swelling   • Phenazopyridine Hives       The following portions of the patient's history were reviewed and updated as appropriate: allergies, current medications, past family history, past medical history, past social history, past surgical history and problem list.        Objective    Objective      Vital Signs:   Vitals:    08/31/21 1119   BP: 151/94   Pulse: 85   Weight: 105 kg (231 lb 7.7 oz)   Height: 175.3 cm (69.02\")       Ortho Exam:  General: no acute distress, comfortable  Vitals reviewed in chart  Head: normocephalic, atraumatic  Ears: no external drainage noted  Eyes: extraocular muscles appear intact with good tracking  Psych: alert and oriented, normal appearing mood  Vascular: 2+ pulses symmetric, well perfused  Neurologic:  Sensation to light touch intact " distally  Spine/Cervical exam: motion preserved  Dermatologic: skin not hot/red/swollen  Respiratory: breathing comfortably on room air, no intercostal retractions  Cardiovascular: regular rate and rhythm, well perfused    Musculoskeletal Exam    SIDE: LEFT SHOULDER  Shoulder Exam:    Tenderness: biceps tendon    Range of motion measurements (degrees)  Forward flexion/Abduction/External rotation at side/ER at 90/IR at 90/IR position  Active: 140/140/60/80/70   Passive: SAME, PAIN LIMITED    Painful arc of motion: YES  No evidence of septic joint  Pain with forward flexion and abduction greater: 90  Impingement testing Neer's test - positive/painful  Impingement testing Hawkin's test - positive/painful    Rotator Cuff Testing:  Tenderness to palpation at rotator cuff - negative  Rotator cuff testing Christie's test - negative    Scapular dyskinesis - present, abnormal scapular motion    Long head of the biceps testing:  Pizarro's test for biceps - positive  Bicipital groove tenderness to palpation - positive  Biceps as Tension Band/Anterior pain with Lift Off - positive  Speed's test  - positive  Tenderness to palpation at biceps tendon - positive    AC Joint:  AC joint tenderness to palpation - negative      Results Review:   Imaging Results (Last 24 Hours)     ** No results found for the last 24 hours. **        CT Arthrogram Upper Extremity (Unilateral Any Joint)    Result Date: 8/23/2021  1. Mildly undermined appearance of the superior labrum, normal variant attachment or the thin labral tear. Please correlate with symptoms. Otherwise normal-appearing superior labrum.  2. Humeral head appears relatively posterior subluxed with relation to glenoid. Flattened/atrophic morphology of the posterior labrum but no visible tear.  3. No evidence of rotator cuff tear. Biceps tendon appears intact.  4. No other evidence of left shoulder internal derangement or acute or healing trauma.  DICTATED:   08/19/2021 EDITED/ls :    08/19/2021  This report was finalized on 8/23/2021 10:04 PM by Dr. Jef Nguyen MD.      FL Contrast Injection CT / MRI    Result Date: 8/21/2021  Successful fluoroscopic guided left shoulder joint injection with contrast for CT imaging as above with no immediate complications.  This report was finalized on 8/21/2021 4:38 PM by Dr. Jef Nguyen MD.        I interpreted CT scan and xrays above -- no significant rotator cuff or labral pathology.  Baseline posterior subluxation -- early B1 type appearance    Procedures     LEFT SHOULDER BICEPS TENDON SHEATH INJECTION: Risks and benefits of a shoulder biceps tendon sheath injection were discussed and the patient desired to proceed. Verbal consent was obtained. The patient understood the risk of infection, potential skin changes, bump in blood glucose especially with diabetes, nerve injury, possibility of increased pain in the short term, and possible incomplete pain relief. Using sterile technique, the shoulder biceps tendon sheath was injected from an anterior approach with 80mg/mL of Depo Medrol and 4cc of lidocaine with aspiration prior to injection. The patient tolerated the procedure without difficulty. CPT CODE 02575 for tendon sheath injection          Assessment / Plan      Assessment/Plan:   Problem List Items Addressed This Visit        Musculoskeletal and Injuries    Biceps tendinitis of left upper extremity - Primary    Relevant Orders    Ambulatory Referral to Physical Therapy Evaluate and treat, Ortho (Completed)    Bursitis of left shoulder    Relevant Orders    Ambulatory Referral to Physical Therapy Evaluate and treat, Ortho (Completed)    Impingement syndrome of left shoulder    Relevant Orders    Ambulatory Referral to Physical Therapy Evaluate and treat, Ortho (Completed)          Selective rest/activity modifications recommended while the shoulder recovers.    Counseling - I counseled the patient on the diagnosis and treatment plan.  All questions were  answered.    Corticosteroid injection - the risks and benefits for a steroid injection were discussed.  The patient desired to proceed with an injection.  I emphasized this is a short-term pain relief option and multiple steroid injections are generally avoided, but an injection can be helpful with pain relief while the shoulder recovers.    Physical therapy prescription provided and scapular exercise program discussed - physical therapy will be very important to the patient's treatment and recovery.        When will I see the patient back?  I will see the patient back in 2 months to follow-up their progress pending progress or sooner if needed.  If the patient is improved then they can call to cancel the appointment.  If the patient has continued pain, then we will look for other potential causes of shoulder pain at the follow-up appointment.  The jefferson now is to improve the range of motion and gradually decrease the pain they are experiencing.      Follow Up: 2 months        Juan Coker MD, FAAOS  Orthopedic Surgeon  Fellowship Trained Shoulder and Elbow Surgeon  [facility]  Orthopedics and Sports Medicine  [address] [place] [address], [address]  [place] [address]    08/31/21  11:49 EDT    Please note that portions of this note may have been completed with a voice recognition program. Efforts were made to edit the dictations, but occasionally words are mistranscribed.

## 2021-08-31 NOTE — PROGRESS NOTES
Procedure   Large Joint Arthrocentesis-left biceps tendon injection  Date/Time: 8/31/2021 11:48 AM  Consent given by: patient  Site marked: site marked  Timeout: Immediately prior to procedure a time out was called to verify the correct patient, procedure, equipment, support staff and site/side marked as required   Supporting Documentation  Indications: pain   Procedure Details  Location: left biceps tendon.  Preparation: Patient was prepped and draped in the usual sterile fashion  Needle size: 23 G  Approach: anterior  Medications administered: 5 mL lidocaine PF 1% 1 %; 80 mg methylPREDNISolone acetate 80 MG/ML  Patient tolerance: patient tolerated the procedure well with no immediate complications

## 2021-09-02 RX ORDER — LIDOCAINE HYDROCHLORIDE 10 MG/ML
4 INJECTION, SOLUTION EPIDURAL; INFILTRATION; INTRACAUDAL; PERINEURAL
Status: COMPLETED | OUTPATIENT
Start: 2021-08-30 | End: 2021-08-30

## 2021-09-02 RX ORDER — TRIAMCINOLONE ACETONIDE 40 MG/ML
40 INJECTION, SUSPENSION INTRA-ARTICULAR; INTRAMUSCULAR
Status: COMPLETED | OUTPATIENT
Start: 2021-08-30 | End: 2021-08-30

## 2021-09-09 ENCOUNTER — TELEPHONE (OUTPATIENT)
Dept: ORTHOPEDIC SURGERY | Facility: CLINIC | Age: 61
End: 2021-09-09

## 2021-09-09 DIAGNOSIS — M16.11 PRIMARY OSTEOARTHRITIS OF RIGHT HIP: Primary | ICD-10-CM

## 2021-09-09 DIAGNOSIS — M25.551 RIGHT HIP PAIN: ICD-10-CM

## 2021-09-09 NOTE — TELEPHONE ENCOUNTER
Radha,    I called Mrs. Johns, she states the injection that was given on 8/30/21 did not help. She is in a lot of pain and is requesting another injection. I told her I thought it might be too soon and she may need to give it a while longer as it has only been 10 days. She really feels she needs another injection. Please advise thank you!  Latoya

## 2021-09-10 NOTE — TELEPHONE ENCOUNTER
I called patient and told her what Radha said and she understood. She will call to make her follow up once the injection is scheduled.  Latoya

## 2021-09-10 NOTE — TELEPHONE ENCOUNTER
Radha's response:       Placed referral for fluoroscopy guided intra-articular right hip injection.  This will be done through radiology so they will contact her to get it scheduled.  Patient will need to follow-up 4 weeks after that injection.

## 2021-09-15 ENCOUNTER — TELEPHONE (OUTPATIENT)
Dept: ORTHOPEDIC SURGERY | Facility: CLINIC | Age: 61
End: 2021-09-15

## 2021-09-15 DIAGNOSIS — M16.11 PRIMARY OSTEOARTHRITIS OF RIGHT HIP: ICD-10-CM

## 2021-09-15 DIAGNOSIS — M25.551 RIGHT HIP PAIN: Primary | ICD-10-CM

## 2021-09-15 NOTE — TELEPHONE ENCOUNTER
Caller: Lizeth Johns    Relationship: Self    Best call back number: [phone] (HOME - HAS VMAIL; NO CELL)    What orders are you requesting (i.e. lab or imaging): RIGHT HIP WARM WATER AQUATHERAPY ORDER     In what timeframe would the patient need to come in: PATIENT CURRENTLY SCHEDULED FOR WARM WATER AQUATHERAPY FOR LEFT SHOULDER PER DR. GRULLON - 1ST AQUATHERAPY EVALUATION APPT TODAY 09/15/21 AT 1145    Where will you receive your lab/imaging services: [facility]    Additional notes: PATIENT ASKING IF ANDRE FERNANDEZ WILL WRITE AQUATHERAPY ORDER FOR PATIENT'S RIGHT HIP TO BE WORKED ON SIMULTANEOUSLY WITH LEFT SHOULDER AQUATHERAPY    PATIENT'S Best call back number: [phone] (HOME - HAS VMAIL; NO CELL)     PLEASE CALL / LEAVE VMAIL IF / WHEN RIGHT HIP AQUATHERAPY ORDER READY FOR     THANKS

## 2021-09-16 NOTE — TELEPHONE ENCOUNTER
Called pt to let her know CR[facility] placed order and asked where she wanted us to send it. She requested it be faxed to  [facility] in [place].    Faxed it to [phone]    Tara

## 2021-09-20 ENCOUNTER — HOSPITAL ENCOUNTER (OUTPATIENT)
Dept: GENERAL RADIOLOGY | Facility: HOSPITAL | Age: 61
Discharge: HOME | End: 2021-09-20
Admitting: PHYSICIAN ASSISTANT

## 2021-09-20 DIAGNOSIS — M16.11 PRIMARY OSTEOARTHRITIS OF RIGHT HIP: ICD-10-CM

## 2021-09-20 DIAGNOSIS — M25.551 RIGHT HIP PAIN: ICD-10-CM

## 2021-09-20 PROCEDURE — 25010000002 TRIAMCINOLONE PER 10 MG: Performed by: PHYSICIAN ASSISTANT

## 2021-09-20 PROCEDURE — 77002 NEEDLE LOCALIZATION BY XRAY: CPT

## 2021-09-20 PROCEDURE — 25010000002 IOPAMIDOL 61 % SOLUTION: Performed by: PHYSICIAN ASSISTANT

## 2021-09-20 RX ORDER — LIDOCAINE HYDROCHLORIDE 10 MG/ML
5 INJECTION, SOLUTION EPIDURAL; INFILTRATION; INTRACAUDAL; PERINEURAL ONCE
Status: COMPLETED | OUTPATIENT
Start: 2021-09-20 | End: 2021-09-20

## 2021-09-20 RX ORDER — TRIAMCINOLONE ACETONIDE 40 MG/ML
80 INJECTION, SUSPENSION INTRA-ARTICULAR; INTRAMUSCULAR ONCE
Status: COMPLETED | OUTPATIENT
Start: 2021-09-20 | End: 2021-09-20

## 2021-09-20 RX ORDER — LIDOCAINE HYDROCHLORIDE 10 MG/ML
3 INJECTION, SOLUTION EPIDURAL; INFILTRATION; INTRACAUDAL; PERINEURAL ONCE
Status: COMPLETED | OUTPATIENT
Start: 2021-09-20 | End: 2021-09-20

## 2021-09-20 RX ORDER — BUPIVACAINE HYDROCHLORIDE 2.5 MG/ML
3 INJECTION, SOLUTION EPIDURAL; INFILTRATION; INTRACAUDAL ONCE
Status: COMPLETED | OUTPATIENT
Start: 2021-09-20 | End: 2021-09-20

## 2021-09-20 RX ADMIN — BUPIVACAINE HYDROCHLORIDE 3 ML: 2.5 INJECTION, SOLUTION EPIDURAL; INFILTRATION; INTRACAUDAL; PERINEURAL at 14:43

## 2021-09-20 RX ADMIN — LIDOCAINE HYDROCHLORIDE 3 ML: 10 INJECTION, SOLUTION EPIDURAL; INFILTRATION; INTRACAUDAL; PERINEURAL at 14:43

## 2021-09-20 RX ADMIN — IOPAMIDOL 10 ML: 612 INJECTION, SOLUTION INTRAVENOUS at 14:43

## 2021-09-20 RX ADMIN — LIDOCAINE HYDROCHLORIDE 5 ML: 10 INJECTION, SOLUTION EPIDURAL; INFILTRATION; INTRACAUDAL; PERINEURAL at 14:44

## 2021-09-20 RX ADMIN — TRIAMCINOLONE ACETONIDE 80 MG: 40 INJECTION, SUSPENSION INTRA-ARTICULAR; INTRAMUSCULAR at 14:43

## 2021-09-23 ENCOUNTER — HOSPITAL ENCOUNTER (OUTPATIENT)
Dept: MAMMOGRAPHY | Facility: HOSPITAL | Age: 61
Discharge: HOME | End: 2021-09-23
Admitting: NURSE PRACTITIONER

## 2021-09-23 DIAGNOSIS — Z12.31 VISIT FOR SCREENING MAMMOGRAM: ICD-10-CM

## 2021-09-23 PROCEDURE — 77067 SCR MAMMO BI INCL CAD: CPT | Performed by: RADIOLOGY

## 2021-09-23 PROCEDURE — 77063 BREAST TOMOSYNTHESIS BI: CPT | Performed by: RADIOLOGY

## 2021-09-23 PROCEDURE — 77063 BREAST TOMOSYNTHESIS BI: CPT

## 2021-09-23 PROCEDURE — 77067 SCR MAMMO BI INCL CAD: CPT

## 2021-10-19 ENCOUNTER — OFFICE VISIT (OUTPATIENT)
Dept: ORTHOPEDIC SURGERY | Facility: CLINIC | Age: 61
End: 2021-10-19

## 2021-10-19 VITALS
HEART RATE: 87 BPM | HEIGHT: 69 IN | SYSTOLIC BLOOD PRESSURE: 124 MMHG | WEIGHT: 224 LBS | DIASTOLIC BLOOD PRESSURE: 88 MMHG | BODY MASS INDEX: 33.18 KG/M2

## 2021-10-19 DIAGNOSIS — M16.11 PRIMARY OSTEOARTHRITIS OF RIGHT HIP: ICD-10-CM

## 2021-10-19 DIAGNOSIS — M25.551 RIGHT HIP PAIN: Primary | ICD-10-CM

## 2021-10-19 DIAGNOSIS — M25.50 PAIN IN JOINT, MULTIPLE SITES: ICD-10-CM

## 2021-10-19 PROCEDURE — 99213 OFFICE O/P EST LOW 20 MIN: CPT | Performed by: PHYSICIAN ASSISTANT

## 2021-10-19 RX ORDER — METHOCARBAMOL 500 MG/1
TABLET, FILM COATED ORAL
COMMUNITY
Start: 2021-09-14

## 2021-10-19 RX ORDER — DULAGLUTIDE 0.75 MG/.5ML
INJECTION, SOLUTION SUBCUTANEOUS
COMMUNITY
Start: 2021-10-15

## 2021-10-19 NOTE — PROGRESS NOTES
"    [facility] Note        Subjective     CC: Follow-up (1 month s/p (R) hip fluoro guided hip injection 09/20/2021)      HPI    Lizeth Johns is a 61 y.o. female.  Patient returns for follow-up following fluoroscopic right hip injection.  She reports 100% relief following this injection, stating she was able to discontinue use of her cane.  The previous injection given on 8/30/2021 to the lateral aspect of her hip, trochanteric bursa was ineffective.  Unfortunately patient states she fell on this past Saturday which is slightly increased the pain to her hip.    At this time she endorses a pain scale of 3/10.  She notes walking, standing, stair climbing movement of the hip do cause increase in pain although she feels better since having had the intra-articular injection to the hip.  She is working with physical therapy and states it has helped.    Of note patient is due seeing Dr. Hartley ( interventional pain specialist) for injections into her low back.  She has chronic pain to this area requiring use of Norco 10 mg.  Additionally she tells me that she has a sister who has a daughter diagnosed with lupus and she had a first cousin that underwent bilateral THAs in his 40s (reason unknown).    Overall, patient's symptoms are improved following intra-articular hip injection.    ROS:    Constiutional:Pt denies fever, chills, nausea, or vomiting.  MSK:as above        Objective      Past Medical History  Past Medical History:   Diagnosis Date   • Diabetes (HCC)    • Fibromyalgia    • Hypertension    • Osteoarthritis          Physical Exam  /88   Pulse 87   Ht 175.3 cm (69.02\")   Wt 102 kg (224 lb)   BMI 33.06 kg/m²     Body mass index is 33.06 kg/m².    Patient is well nourished and well developed.        Ortho Exam  Integument:   Right hip: No skin lesions, no rash, no ecchymosis    Neurologic:   Sensation:    Right foot: Intact to light touch on the dorsal and plantar " aspect   Motor:    Right lower extremity: 5/5 quadriceps, hamstrings, ankle dorsiflexors, and ankle plantar flexors    Vascular:   Right lower extremity: 2+ dorsalis pedis pulse, prompt capillary refill    Lower Extremity:   Right hip:    Tenderness:  Continues to have tenderness but not as bad to the low back area with radiation to the anterior aspect of the hip, this does include lateral aspect of hip we have previously the bursa.    Swelling:  None    Crepitus:  None    Atrophy:  None    Range of motion:  External Rotation: 25°       Internal Rotation: 25°       Flexion:  100°       Extension:  0°     Instability:  None    Deformities:  None    Functional testing: Negative [facility]     No leg length discrepancy      Imaging/Labs/EMG Reviewed:  No new imaging today.      Assessment:  1. Right hip pain    2. Primary osteoarthritis of right hip    3. Pain in joint, multiple sites        Plan:  Right hip pain, (mild to moderate) hip osteoarthritis--patient is not a candidate for NILAM at this time.  She needs to continue working with specialist as well as formal PT.  Multijoint arthralgias (bilateral shoulders, bilateral hips, bilateral knees)--patient referred her to rheumatology for further evaluation and treatment.  Currently taking Norco 10 mg for pain control (low back)--continue as directed by her pain management specialist recommend transition off when/as able.  Follow-up as needed.  Questions and concerns answered.      Radha Drew PA-C  10/25/21  08:21 LAVERNET      Dragon disclaimer:  Much of this encounter note is an electronic transcription/translation of spoken language to printed text. The electronic translation of spoken language may permit erroneous, or at times, nonsensical words or phrases to be inadvertently transcribed; Although I have reviewed the note for such errors, some may still exist.

## 2021-10-28 ENCOUNTER — OFFICE VISIT (OUTPATIENT)
Dept: ORTHOPEDIC SURGERY | Facility: CLINIC | Age: 61
End: 2021-10-28

## 2021-10-28 VITALS
WEIGHT: 224.87 LBS | HEIGHT: 69 IN | BODY MASS INDEX: 33.31 KG/M2 | SYSTOLIC BLOOD PRESSURE: 131 MMHG | HEART RATE: 92 BPM | DIASTOLIC BLOOD PRESSURE: 97 MMHG

## 2021-10-28 DIAGNOSIS — M75.52 BURSITIS OF LEFT SHOULDER: ICD-10-CM

## 2021-10-28 DIAGNOSIS — M75.42 IMPINGEMENT SYNDROME OF LEFT SHOULDER: ICD-10-CM

## 2021-10-28 DIAGNOSIS — M75.22 BICEPS TENDINITIS OF LEFT UPPER EXTREMITY: Primary | ICD-10-CM

## 2021-10-28 PROCEDURE — 99212 OFFICE O/P EST SF 10 MIN: CPT | Performed by: ORTHOPAEDIC SURGERY

## 2021-10-28 NOTE — PROGRESS NOTES
"                                                                [facility] [place] Orthopaedic Surgery Office Follow Up       Office Follow Up Visit       Patient Name: Lizeth Johns    Chief Complaint:   Chief Complaint   Patient presents with   • Follow-up     2 month follow up - Biceps tendinitis of left upper extremity       Referring Physician: No ref. provider found    History of Present Illness:   It has been 2  month(s) since Lizeth Johns's last visit. Lizeth Johns returns to clinic today for F/U: follow-up of leftBody Part: shoulderReason: pain. The issue has been ongoing for 5 month(s). Lizeth Johns rates HIS/HER: herpain at 3/10 on the pain scale. Previous/current treatments: physical therapy. Current symptoms:Symptoms: pain and same as prior visit. The pain is worse with working, leisure, lying on affected side and any movement of the joint; ice improves the pain. Overall, he/she: sheis doing the same.  I have reviewed the patient's history of present illness as noted/entered above.    I have reviewed the patient's past medical history, surgical history, social history, family history, medications, and allergies as noted in the electronic medical record and as noted/entered.  I have reviewed the patient's review of systems as noted/enter and updated as noted in the patient's HPI.    LEFT SHOULDER    10/28/2021:  Left shoulder  Pain persists -- primarily anterior biceps pain    8/31/2021 left biceps tendon sheath injection  Hip joint injection helped  Interested shoulder GH joint injection    Seeing Rheumatology    Prior history:  Pain persists she notes  PT at [facility] for several weeks - \"worked while I was there\" but still hurt at home  I reviewed her CT arthrogram and counseled     Enjoys: walking, fitness room, swimming     Anterior biceps pain     Disabled      CT Arthrogram Upper Extremity (Unilateral Any Joint)     Result Date: 8/23/2021  1. Mildly undermined appearance of the " superior labrum, normal variant attachment or the thin labral tear. Please correlate with symptoms. Otherwise normal-appearing superior labrum.  2. Humeral head appears relatively posterior subluxed with relation to glenoid. Flattened/atrophic morphology of the posterior labrum but no visible tear.  3. No evidence of rotator cuff tear. Biceps tendon appears intact.  4. No other evidence of left shoulder internal derangement or acute or healing trauma.  DICTATED:   08/19/2021 EDITED/ls :   08/19/2021  This report was finalized on 8/23/2021 10:04 PM by Dr. Jef Nguyen MD.       FL Contrast Injection CT / MRI     Result Date: 8/21/2021  Successful fluoroscopic guided left shoulder joint injection with contrast for CT imaging as above with no immediate complications.  This report was finalized on 8/21/2021 4:38 PM by Dr. Jef Nguyen MD.          I interpreted CT scan and xrays above -- no significant rotator cuff or labral pathology.  Baseline posterior subluxation -- early B1 type appearance      Subjective   Subjective      Review of Systems   Constitutional: Negative.  Negative for chills, fatigue and fever.   HENT: Negative.  Negative for congestion and dental problem.    Eyes: Negative.  Negative for blurred vision.   Respiratory: Negative.  Negative for shortness of breath.    Cardiovascular: Negative.  Negative for leg swelling.   Gastrointestinal: Negative.  Negative for abdominal pain.   Endocrine: Negative.  Negative for polyuria.   Genitourinary: Negative.  Negative for difficulty urinating.   Musculoskeletal: Positive for arthralgias.   Skin: Negative.    Allergic/Immunologic: Negative.    Neurological: Negative.    Hematological: Negative.  Negative for adenopathy.   Psychiatric/Behavioral: Negative.  Negative for behavioral problems.        Past Medical History:   Past Medical History:   Diagnosis Date   • Diabetes (HCC)    • Fibromyalgia    • Hypertension    • Osteoarthritis        Past Surgical History:    Past Surgical History:   Procedure Laterality Date   • BACK SURGERY     • BLADDER SURGERY     • CATARACT EXTRACTION     • CYST REMOVAL     • ENDOMETRIAL ABLATION     • GALLBLADDER SURGERY         Family History:   Family History   Problem Relation Age of Onset   • Ovarian cancer Sister         DX AGE UNKNOWN   • Ovarian cancer Maternal Aunt         DX AGE UNKNOWN   • Ovarian cancer Mother         DX AGE UNKNOWN   • Breast cancer Neg Hx        Social History:   Social History     Socioeconomic History   • Marital status:    Tobacco Use   • Smoking status: Former Smoker     Types: Cigarettes   • Smokeless tobacco: Never Used   Substance and Sexual Activity   • Alcohol use: Never   • Drug use: Never   • Sexual activity: Defer       Medications:   Current Outpatient Medications:   •  amLODIPine (NORVASC) 5 MG tablet, , Disp: , Rfl:   •  Apriso 0.375 g 24 hr capsule, , Disp: , Rfl:   •  aspirin 81 MG EC tablet, Take 81 mg by mouth Daily., Disp: , Rfl:   •  busPIRone (BUSPAR) 10 MG tablet, , Disp: , Rfl:   •  Diclofenac Sodium (VOLTAREN) 1 % gel gel, , Disp: , Rfl:   •  donepezil (ARICEPT) 10 MG tablet, , Disp: , Rfl:   •  DULoxetine (CYMBALTA) 60 MG capsule, , Disp: , Rfl:   •  estradiol (VIVELLE-DOT) 0.1 MG/24HR patch, , Disp: , Rfl:   •  fluconazole (DIFLUCAN) 150 MG tablet, , Disp: , Rfl:   •  HumaLOG KwikPen 100 UNIT/ML solution pen-injector, , Disp: , Rfl:   •  HYDROcodone-acetaminophen (NORCO)  MG per tablet, , Disp: , Rfl:   •  Insulin Glargine (BASAGLAR KWIKPEN) 100 UNIT/ML injection pen, , Disp: , Rfl:   •  Lactobacillus (Acidophilus) 100 MG capsule, Take 350 mg by mouth., Disp: , Rfl:   •  magnesium oxide (MAGOX) 400 (241.3 Mg) MG tablet tablet, , Disp: , Rfl:   •  methocarbamol (ROBAXIN) 500 MG tablet, , Disp: , Rfl:   •  methocarbamol (ROBAXIN) 750 MG tablet, , Disp: , Rfl:   •  metoprolol tartrate (LOPRESSOR) 100 MG tablet, , Disp: , Rfl:   •  omeprazole (priLOSEC) 40 MG capsule, , Disp: ,  "Rfl:   •  pregabalin (LYRICA) 150 MG capsule, , Disp: , Rfl:   •  progesterone (PROMETRIUM) 100 MG capsule, , Disp: , Rfl:   •  simvastatin (ZOCOR) 40 MG tablet, , Disp: , Rfl:   •  triamcinolone (KENALOG) 0.025 % cream, Apply  topically to the appropriate area as directed 2 (Two) Times a Day., Disp: , Rfl:   •  Trulicity 0.75 MG/0.5ML solution pen-injector, , Disp: , Rfl:     Allergies:   Allergies   Allergen Reactions   • Bactrim [Sulfamethoxazole-Trimethoprim] Hives   • Flavoxate Hives   • Lisinopril Swelling   • Phenazopyridine Hives       The following portions of the patient's history were reviewed and updated as appropriate: allergies, current medications, past family history, past medical history, past social history, past surgical history and problem list.        Objective    Objective      Vital Signs:   Vitals:    10/28/21 0803   BP: 131/97   Pulse: 92   Weight: 102 kg (224 lb 13.9 oz)   Height: 175.3 cm (69.02\")       Ortho Exam:  LEFT SHOULDER anterior biceps pain  Some GH joint pain  Good strength  Well perfused  Satisfactory AROM/PROM    Results Review:  Imaging Results (Last 24 Hours)     ** No results found for the last 24 hours. **          CT Arthrogram Upper Extremity (Unilateral Any Joint)    Result Date: 8/23/2021  1. Mildly undermined appearance of the superior labrum, normal variant attachment or the thin labral tear. Please correlate with symptoms. Otherwise normal-appearing superior labrum.  2. Humeral head appears relatively posterior subluxed with relation to glenoid. Flattened/atrophic morphology of the posterior labrum but no visible tear.  3. No evidence of rotator cuff tear. Biceps tendon appears intact.  4. No other evidence of left shoulder internal derangement or acute or healing trauma.  DICTATED:   08/19/2021 EDITED/ls :   08/19/2021  This report was finalized on 8/23/2021 10:04 PM by Dr. Jef Nguyen MD.        Procedures            Assessment / Plan      Assessment/Plan: "   Problem List Items Addressed This Visit        Musculoskeletal and Injuries    Biceps tendinitis of left upper extremity - Primary    Relevant Orders    FL Guide For Pain Meds Inj    Bursitis of left shoulder    Relevant Orders    FL Guide For Pain Meds Inj    Impingement syndrome of left shoulder    Relevant Orders    FL Guide For Pain Meds Inj          LEFT SHOULDER - biceps tendonitis  Early B1 glenoid - not arthritic to necessitate arthroplasty  Conservative course recommended    Patient is interested in a glenohumeral joint injection on the left that she is responded to hip joint injection I think it is a very reasonable course this was ordered she does have some early arthritic type signatures but otherwise shoulder is well-appearing on the prior CT arthrogram.    Follow Up: as needed      Juan Coker MD, FAAOS  Orthopedic Surgeon  Fellowship Trained Shoulder and Elbow Surgeon  [facility]  Orthopedics and Sports Medicine  [address] [place] [address], [address]  [place] [address]    10/28/21  08:41 EDT    Please note that portions of this note may have been completed with a voice recognition program. Efforts were made to edit the dictations, but occasionally words are mistranscribed.

## 2021-11-09 ENCOUNTER — HOSPITAL ENCOUNTER (OUTPATIENT)
Dept: GENERAL RADIOLOGY | Facility: HOSPITAL | Age: 61
Discharge: HOME | End: 2021-11-09
Admitting: ORTHOPAEDIC SURGERY

## 2021-11-09 DIAGNOSIS — M75.22 BICEPS TENDINITIS OF LEFT UPPER EXTREMITY: ICD-10-CM

## 2021-11-09 DIAGNOSIS — M75.42 IMPINGEMENT SYNDROME OF LEFT SHOULDER: ICD-10-CM

## 2021-11-09 DIAGNOSIS — M75.52 BURSITIS OF LEFT SHOULDER: ICD-10-CM

## 2021-11-09 PROCEDURE — 25010000002 IOPAMIDOL 61 % SOLUTION: Performed by: ORTHOPAEDIC SURGERY

## 2021-11-09 PROCEDURE — 77002 NEEDLE LOCALIZATION BY XRAY: CPT

## 2021-11-09 PROCEDURE — 0 LIDOCAINE 1 % SOLUTION: Performed by: PHYSICIAN ASSISTANT

## 2021-11-09 RX ORDER — LIDOCAINE HYDROCHLORIDE 10 MG/ML
5 INJECTION, SOLUTION INFILTRATION; PERINEURAL ONCE
Status: COMPLETED | OUTPATIENT
Start: 2021-11-09 | End: 2021-11-09

## 2021-11-09 RX ORDER — METHYLPREDNISOLONE ACETATE 40 MG/ML
80 INJECTION, SUSPENSION INTRA-ARTICULAR; INTRALESIONAL; INTRAMUSCULAR; SOFT TISSUE ONCE
Status: DISCONTINUED | OUTPATIENT
Start: 2021-11-09 | End: 2021-11-10 | Stop reason: HOSPADM

## 2021-11-09 RX ORDER — LIDOCAINE HYDROCHLORIDE 10 MG/ML
5 INJECTION, SOLUTION EPIDURAL; INFILTRATION; INTRACAUDAL; PERINEURAL ONCE
Status: DISCONTINUED | OUTPATIENT
Start: 2021-11-09 | End: 2021-11-10 | Stop reason: HOSPADM

## 2021-11-09 RX ADMIN — IOPAMIDOL 10 ML: 612 INJECTION, SOLUTION INTRAVENOUS at 08:53

## 2021-11-09 RX ADMIN — LIDOCAINE HYDROCHLORIDE 5 ML: 10 INJECTION, SOLUTION INFILTRATION; PERINEURAL at 08:53

## 2022-08-18 ENCOUNTER — OFFICE VISIT (OUTPATIENT)
Dept: ORTHOPEDIC SURGERY | Facility: CLINIC | Age: 62
End: 2022-08-18

## 2022-08-18 VITALS
WEIGHT: 236 LBS | DIASTOLIC BLOOD PRESSURE: 78 MMHG | BODY MASS INDEX: 34.96 KG/M2 | SYSTOLIC BLOOD PRESSURE: 140 MMHG | HEIGHT: 69 IN

## 2022-08-18 DIAGNOSIS — M75.42 IMPINGEMENT SYNDROME OF LEFT SHOULDER: ICD-10-CM

## 2022-08-18 DIAGNOSIS — M75.00 DIABETIC FROZEN SHOULDER ASSOCIATED WITH TYPE 2 DIABETES MELLITUS: ICD-10-CM

## 2022-08-18 DIAGNOSIS — M75.02 ADHESIVE CAPSULITIS OF LEFT SHOULDER: Primary | ICD-10-CM

## 2022-08-18 DIAGNOSIS — M24.512 CONTRACTURE OF JOINT OF LEFT SHOULDER REGION: ICD-10-CM

## 2022-08-18 DIAGNOSIS — M75.52 BURSITIS OF LEFT SHOULDER: ICD-10-CM

## 2022-08-18 DIAGNOSIS — M75.22 BICEPS TENDINITIS OF LEFT UPPER EXTREMITY: ICD-10-CM

## 2022-08-18 DIAGNOSIS — E11.618 DIABETIC FROZEN SHOULDER ASSOCIATED WITH TYPE 2 DIABETES MELLITUS: ICD-10-CM

## 2022-08-18 DIAGNOSIS — M25.512 LEFT SHOULDER PAIN, UNSPECIFIED CHRONICITY: ICD-10-CM

## 2022-08-18 PROCEDURE — 99214 OFFICE O/P EST MOD 30 MIN: CPT | Performed by: ORTHOPAEDIC SURGERY

## 2022-08-18 NOTE — PROGRESS NOTES
"                                                                [facility] [place] [facility] Follow Up       Office Follow Up Visit       Patient Name: Lizeth Johns    Chief Complaint:   Chief Complaint   Patient presents with   • Left Shoulder - Pain, Follow-up     Biceps tendinitis of left upper extremity        Referring Physician: No ref. provider found    History of Present Illness:   It has been 1  year(s) since Lizeth Johns's last visit. Lizeth Johns returns to clinic today for F/U: follow-up of leftBody Part: shoulderReason: pain. The issue has been ongoing for 1 year(s). Lizeth Johns rates HIS/HER: herpain at 6/10 on the pain scale. Previous/current treatments: NSAIDS, physical therapy, oral steroids and steroid injection (last injection 11/9/21). Current symptoms:Symptoms: pain, swelling and giving way/buckling. The pain is worse with walking, standing, sleeping, lying on affected side, rising from seated position and any movement of the joint; resting and heat improves the pain. Overall, he/she: sheis doing worse. I have reviewed the patient's history of present illness as noted/entered above.    I have reviewed the patient's past medical history, surgical history, social history, family history, medications, and allergies as noted in the electronic medical record and as noted/entered.  I have reviewed the patient's review of systems as noted/enter and updated as noted in the patient's HPI.      LEFT SHOULDER     10/28/2021:  Left shoulder  Pain persists -- primarily anterior biceps pain     8/31/2021 left biceps tendon sheath injection  Hip joint injection helped  Interested shoulder GH joint injection     Seeing Rheumatology     Prior history:  Pain persists she notes  PT at [facility] for several weeks - \"worked while I was there\" but still hurt at home  I reviewed her CT arthrogram and counseled     Enjoys: walking, fitness room, swimming     Anterior biceps " "pain     Disabled        CT Arthrogram Upper Extremity (Unilateral Any Joint)     Result Date: 8/23/2021  1. Mildly undermined appearance of the superior labrum, normal variant attachment or the thin labral tear. Please correlate with symptoms. Otherwise normal-appearing superior labrum.  2. Humeral head appears relatively posterior subluxed with relation to glenoid. Flattened/atrophic morphology of the posterior labrum but no visible tear.  3. No evidence of rotator cuff tear. Biceps tendon appears intact.  4. No other evidence of left shoulder internal derangement or acute or healing trauma.  DICTATED:   08/19/2021 EDITED/ls :   08/19/2021  This report was finalized on 8/23/2021 10:04 PM by Dr. Jef Nguyen MD.       FL Contrast Injection CT / MRI     Result Date: 8/21/2021  Successful fluoroscopic guided left shoulder joint injection with contrast for CT imaging as above with no immediate complications.  This report was finalized on 8/21/2021 4:38 PM by Dr. Jef Nguyen MD.          I interpreted CT scan and xrays above -- no significant rotator cuff or labral pathology.  Baseline posterior subluxation -- early B1 type appearance      8/18/2022:  LEFT SHOULDER pain worsening  Fluoro guided injection left shoulder joint by radiology - 11/9/2021  Sharp pains with abduction, completed PT  \"I can't do anything with this left arm\"  Pain increased, radiating pain, neck pain  Significantly worse the last 2-3 weeks  Unable to obtain an MRI  [facility] has approved CT for [place] diagnostic    PT with [facility]    Possible neurogenic origin - severe pain response      Subjective   Subjective      Review of Systems     Past Medical History:   Past Medical History:   Diagnosis Date   • Diabetes (HCC)    • Fibromyalgia    • Hypertension    • Osteoarthritis        Past Surgical History:   Past Surgical History:   Procedure Laterality Date   • BACK SURGERY     • BLADDER SURGERY     • CATARACT EXTRACTION     • CYST REMOVAL     • " ENDOMETRIAL ABLATION     • GALLBLADDER SURGERY         Family History:   Family History   Problem Relation Age of Onset   • Ovarian cancer Sister         DX AGE UNKNOWN   • Ovarian cancer Maternal Aunt         DX AGE UNKNOWN   • Ovarian cancer Mother         DX AGE UNKNOWN   • Breast cancer Neg Hx        Social History:   Social History     Socioeconomic History   • Marital status:    Tobacco Use   • Smoking status: Former Smoker     Types: Cigarettes   • Smokeless tobacco: Never Used   Vaping Use   • Vaping Use: Never used   Substance and Sexual Activity   • Alcohol use: Never   • Drug use: Never   • Sexual activity: Defer       Medications:   Current Outpatient Medications:   •  amLODIPine (NORVASC) 5 MG tablet, , Disp: , Rfl:   •  Apriso 0.375 g 24 hr capsule, , Disp: , Rfl:   •  aspirin 81 MG EC tablet, Take 81 mg by mouth Daily., Disp: , Rfl:   •  busPIRone (BUSPAR) 10 MG tablet, , Disp: , Rfl:   •  Collagen-Vitamin C-Biotin 500-50-0.8 MG capsule, Take 1 Scoop by mouth., Disp: , Rfl:   •  Diclofenac Sodium (VOLTAREN) 1 % gel gel, , Disp: , Rfl:   •  donepezil (ARICEPT) 10 MG tablet, , Disp: , Rfl:   •  DULoxetine (CYMBALTA) 60 MG capsule, , Disp: , Rfl:   •  estradiol (VIVELLE-DOT) 0.1 MG/24HR patch, , Disp: , Rfl:   •  fluconazole (DIFLUCAN) 150 MG tablet, , Disp: , Rfl:   •  HumaLOG KwikPen 100 UNIT/ML solution pen-injector, , Disp: , Rfl:   •  HYDROcodone-acetaminophen (NORCO)  MG per tablet, , Disp: , Rfl:   •  Insulin Glargine (BASAGLAR KWIKPEN) 100 UNIT/ML injection pen, , Disp: , Rfl:   •  Lactobacillus (Acidophilus) 100 MG capsule, Take 350 mg by mouth., Disp: , Rfl:   •  magnesium oxide (MAGOX) 400 (241.3 Mg) MG tablet tablet, , Disp: , Rfl:   •  methocarbamol (ROBAXIN) 500 MG tablet, , Disp: , Rfl:   •  methocarbamol (ROBAXIN) 750 MG tablet, , Disp: , Rfl:   •  metoprolol tartrate (LOPRESSOR) 100 MG tablet, , Disp: , Rfl:   •  omeprazole (priLOSEC) 40 MG capsule, , Disp: , Rfl:   •   "pregabalin (LYRICA) 150 MG capsule, , Disp: , Rfl:   •  progesterone (PROMETRIUM) 100 MG capsule, , Disp: , Rfl:   •  simvastatin (ZOCOR) 40 MG tablet, , Disp: , Rfl:   •  triamcinolone (KENALOG) 0.025 % cream, Apply  topically to the appropriate area as directed 2 (Two) Times a Day., Disp: , Rfl:   •  Trulicity 0.75 MG/0.5ML solution pen-injector, , Disp: , Rfl:     Allergies:   Allergies   Allergen Reactions   • Bactrim [Sulfamethoxazole-Trimethoprim] Hives   • Flavoxate Hives   • Lisinopril Swelling   • Phenazopyridine Hives       The following portions of the patient's history were reviewed and updated as appropriate: allergies, current medications, past family history, past medical history, past social history, past surgical history and problem list.        Objective    Objective      Vital Signs:   Vitals:    08/18/22 1443   BP: 140/78   Weight: 107 kg (236 lb)   Height: 175.3 cm (69.02\")       Ortho Exam:  Left shoulder has a very significant/severe pain response.  No evidence of septic joint.  Counseled perhaps it could be to an adhesive capsulitis type picture even with passive motion and abduction minimal motion leads to significant and severe pain response.    No skin changes, no instability    Results Review:  Imaging Results (Last 24 Hours)     Procedure Component Value Units Date/Time    XR Shoulder 2+ View Left [[id number]] Resulted: 08/18/22 1625     Updated: 08/18/22 1626    Narrative:      Imaging: shoulder x-rays 2 views - AP and axillary x-ray views    Side: LEFT SHOULDER    Indication for shoulder x-ray 2 views: shoulder pain    Comparison: Prior clinic comparison views available    Findings: No acute bony pathology. No superior humeral head migration.    The humeral head remains centered in the glenohumeral joint. No evidence   of calcific tendonitis.    Left shoulder shows evidence of glenohumeral joint arthritic findings with   joint space narrowing.    I personally reviewed the above x-rays " and discussed with the patient.            Procedures            Assessment / Plan      Assessment/Plan:   Problem List Items Addressed This Visit        Musculoskeletal and Injuries    Biceps tendinitis of left upper extremity    Relevant Orders    CT shoulder left wo contrast    Ambulatory Referral to Physical Therapy Evaluate and treat, Ortho (Completed)    Bursitis of left shoulder    Relevant Orders    CT shoulder left wo contrast    Ambulatory Referral to Physical Therapy Evaluate and treat, Ortho (Completed)    Impingement syndrome of left shoulder    Relevant Orders    CT shoulder left wo contrast    Ambulatory Referral to Physical Therapy Evaluate and treat, Ortho (Completed)    Diabetic frozen shoulder associated with type 2 diabetes mellitus (HCC)    Relevant Medications    Insulin Glargine (BASAGLAR KWIKPEN) 100 UNIT/ML injection pen    HumaLOG KwikPen 100 UNIT/ML solution pen-injector    Trulicity 0.75 MG/0.5ML solution pen-injector    Other Relevant Orders    Ambulatory Referral to Physical Therapy Evaluate and treat, Ortho (Completed)    Contracture of joint of left shoulder region    Relevant Medications    methocarbamol (ROBAXIN) 750 MG tablet    pregabalin (LYRICA) 150 MG capsule    methocarbamol (ROBAXIN) 500 MG tablet    Other Relevant Orders    CT shoulder left wo contrast    Ambulatory Referral to Physical Therapy Evaluate and treat, Ortho (Completed)      Other Visit Diagnoses     Adhesive capsulitis of left shoulder    -  Primary    Relevant Orders    CT shoulder left wo contrast    Ambulatory Referral to Physical Therapy Evaluate and treat, Ortho (Completed)    Left shoulder pain, unspecified chronicity        Relevant Orders    XR Shoulder 2+ View Left (Completed)    CT shoulder left wo contrast    Ambulatory Referral to Physical Therapy Evaluate and treat, Ortho (Completed)        Left shoulder up stated CT scan has been ordered and I agree with that so updated order was placed again  today.    A CT scan  critical for assessment of the patient's glenoid morphology and rotator cuff status in anticipation of surgical  The patient has participated with conservative measures and a CT scan is the next critical step in decision making.    The indication for the CT scan without arthrogram is for assessment of the patient's glenoid morphology and rotator cuff status  in the setting of glenohumeral joint arthritis.  The CT scan is a critical planning tool.      The patient may have neurogenic origin as well for her pain.  Its a little unclear as she has a very significant pain response in the office today -- no evidence of fracture, instability or infection that would explain this type of finding.  Difficult to fully grasp how the shoulder is contributing to the severe pain response.  We will continue to follow along and see the results of the CT scan for additional guidance.        Follow Up: After left shoulder CT scan.      Juan Coker MD, FAAOS  Orthopedic Surgeon  Fellowship Trained Shoulder and Elbow Surgeon  [facility]  Orthopedics and Sports Medicine  [address] [place] [address], [address]  [place] [address]    08/18/22  16:35 EDT

## 2022-08-30 ENCOUNTER — OFFICE VISIT (OUTPATIENT)
Dept: ORTHOPEDIC SURGERY | Facility: CLINIC | Age: 62
End: 2022-08-30

## 2022-08-30 VITALS
BODY MASS INDEX: 34.94 KG/M2 | DIASTOLIC BLOOD PRESSURE: 74 MMHG | WEIGHT: 235.89 LBS | SYSTOLIC BLOOD PRESSURE: 138 MMHG | HEIGHT: 69 IN

## 2022-08-30 DIAGNOSIS — M75.22 BICEPS TENDINITIS OF LEFT UPPER EXTREMITY: ICD-10-CM

## 2022-08-30 DIAGNOSIS — M75.02 ADHESIVE CAPSULITIS OF LEFT SHOULDER: ICD-10-CM

## 2022-08-30 DIAGNOSIS — M75.52 BURSITIS OF LEFT SHOULDER: ICD-10-CM

## 2022-08-30 DIAGNOSIS — M75.42 IMPINGEMENT SYNDROME OF LEFT SHOULDER: ICD-10-CM

## 2022-08-30 DIAGNOSIS — M25.512 LEFT SHOULDER PAIN, UNSPECIFIED CHRONICITY: Primary | ICD-10-CM

## 2022-08-30 DIAGNOSIS — E11.618 DIABETIC FROZEN SHOULDER ASSOCIATED WITH TYPE 2 DIABETES MELLITUS: ICD-10-CM

## 2022-08-30 DIAGNOSIS — M75.00 DIABETIC FROZEN SHOULDER ASSOCIATED WITH TYPE 2 DIABETES MELLITUS: ICD-10-CM

## 2022-08-30 DIAGNOSIS — M24.512 CONTRACTURE OF JOINT OF LEFT SHOULDER REGION: ICD-10-CM

## 2022-08-30 PROCEDURE — 99214 OFFICE O/P EST MOD 30 MIN: CPT | Performed by: ORTHOPAEDIC SURGERY

## 2022-08-30 NOTE — PROGRESS NOTES
"                                                                [facility] Office Follow Up       Office Follow Up Visit       Patient Name: Lizeth Johns    Chief Complaint:   Chief Complaint   Patient presents with   • Left Shoulder - Follow-up     After CT scan 08/24/2022       Referring Physician: No ref. provider found    History of Present Illness:   It has been 2  week(s) since Lizeth Johns's last visit. Lizeth Johns returns to clinic today for F/U: follow-up of leftBody Part: shoulderReason: pain. The issue has been ongoing for 1 year(s). Lizeth Johns rates HIS/HER: herpain at 5/10 on the pain scale. Previous/current treatments: NSAIDS and physical therapy. Current symptoms:Symptoms: same as prior visit. The pain is worse with walking, standing, sitting, climbing stairs, sleeping, working, leisure, lying on affected side, rising from seated position and any movement of the joint; resting improves the pain. Overall, he/she: sheis doing the same. I have reviewed the patient's history of present illness as noted/entered above.    I have reviewed the patient's past medical history, surgical history, social history, family history, medications, and allergies as noted in the electronic medical record and as noted/entered.  I have reviewed the patient's review of systems as noted/enter and updated as noted in the patient's HPI.      LEFT SHOULDER     10/28/2021:  Left shoulder  Pain persists -- primarily anterior biceps pain     8/31/2021 left biceps tendon sheath injection  Hip joint injection helped  Interested shoulder GH joint injection     Seeing Rheumatology     Prior history:  Pain persists she notes  PT at [facility] for several weeks - \"worked while I was there\" but still hurt at home  I reviewed her CT arthrogram and counseled     Enjoys: walking, fitness room, swimming     Anterior biceps pain     Disabled        CT Arthrogram Upper Extremity (Unilateral Any " "Joint)     Result Date: 8/23/2021  1. Mildly undermined appearance of the superior labrum, normal variant attachment or the thin labral tear. Please correlate with symptoms. Otherwise normal-appearing superior labrum.  2. Humeral head appears relatively posterior subluxed with relation to glenoid. Flattened/atrophic morphology of the posterior labrum but no visible tear.  3. No evidence of rotator cuff tear. Biceps tendon appears intact.  4. No other evidence of left shoulder internal derangement or acute or healing trauma.  DICTATED:   08/19/2021 EDITED/ls :   08/19/2021  This report was finalized on 8/23/2021 10:04 PM by Dr. Jef Nguyen MD.       FL Contrast Injection CT / MRI     Result Date: 8/21/2021  Successful fluoroscopic guided left shoulder joint injection with contrast for CT imaging as above with no immediate complications.  This report was finalized on 8/21/2021 4:38 PM by Dr. Jef Nguyen MD.          I interpreted CT scan and xrays above -- no significant rotator cuff or labral pathology.  Baseline posterior subluxation -- early B1 type appearance        8/18/2022:  LEFT SHOULDER pain worsening  Fluoro guided injection left shoulder joint by radiology - 11/9/2021  Sharp pains with abduction, completed PT  \"I can't do anything with this left arm\"  Pain increased, radiating pain, neck pain  Significantly worse the last 2-3 weeks  Unable to obtain an MRI  [facility] CT for [facility] diagnostic     PT with [facility]     Possible neurogenic origin - severe pain response      8/30/2022:    Left sided radiating pain - \"radiating pain into my thumb\" \"going down to my hand\"    Counseled the shoulder CT was reviewed report and I interpreted the CT it was completed with IV contrast pre and postcontrast-ordered by primary team.  No acute findings noted to the rotator cuff.  No fracture.    She still describes more of a neurogenic origin for her pain.  Counseled that her left shoulder stiffness could continue " to improve potentially over time.  No surgery recommended for the shoulder joint contracture at this time.      Subjective   Subjective      Review of Systems   Constitutional: Negative.  Negative for chills, fatigue and fever.   HENT: Negative.  Negative for congestion and dental problem.    Eyes: Negative.  Negative for blurred vision.   Respiratory: Negative.  Negative for shortness of breath.    Cardiovascular: Negative.  Negative for leg swelling.   Gastrointestinal: Negative.  Negative for abdominal pain.   Endocrine: Negative.  Negative for polyuria.   Genitourinary: Negative.  Negative for difficulty urinating.   Musculoskeletal: Positive for arthralgias.   Skin: Negative.    Allergic/Immunologic: Negative.    Neurological: Negative.    Hematological: Negative.  Negative for adenopathy.   Psychiatric/Behavioral: Negative.  Negative for behavioral problems.        Past Medical History:   Past Medical History:   Diagnosis Date   • Diabetes (HCC)    • Fibromyalgia    • Hypertension    • Osteoarthritis        Past Surgical History:   Past Surgical History:   Procedure Laterality Date   • BACK SURGERY     • BLADDER SURGERY     • CATARACT EXTRACTION     • CYST REMOVAL     • ENDOMETRIAL ABLATION     • GALLBLADDER SURGERY         Family History:   Family History   Problem Relation Age of Onset   • Ovarian cancer Sister         DX AGE UNKNOWN   • Ovarian cancer Maternal Aunt         DX AGE UNKNOWN   • Ovarian cancer Mother         DX AGE UNKNOWN   • Breast cancer Neg Hx        Social History:   Social History     Socioeconomic History   • Marital status:    Tobacco Use   • Smoking status: Former Smoker     Types: Cigarettes   • Smokeless tobacco: Never Used   Vaping Use   • Vaping Use: Never used   Substance and Sexual Activity   • Alcohol use: Never   • Drug use: Never   • Sexual activity: Defer       Medications:   Current Outpatient Medications:   •  amLODIPine (NORVASC) 5 MG tablet, , Disp: , Rfl:   •   Apriso 0.375 g 24 hr capsule, , Disp: , Rfl:   •  aspirin 81 MG EC tablet, Take 81 mg by mouth Daily., Disp: , Rfl:   •  busPIRone (BUSPAR) 10 MG tablet, , Disp: , Rfl:   •  Collagen-Vitamin C-Biotin 500-50-0.8 MG capsule, Take 1 Scoop by mouth., Disp: , Rfl:   •  Diclofenac Sodium (VOLTAREN) 1 % gel gel, , Disp: , Rfl:   •  donepezil (ARICEPT) 10 MG tablet, , Disp: , Rfl:   •  DULoxetine (CYMBALTA) 60 MG capsule, , Disp: , Rfl:   •  estradiol (VIVELLE-DOT) 0.1 MG/24HR patch, , Disp: , Rfl:   •  fluconazole (DIFLUCAN) 150 MG tablet, , Disp: , Rfl:   •  HumaLOG KwikPen 100 UNIT/ML solution pen-injector, , Disp: , Rfl:   •  HYDROcodone-acetaminophen (NORCO)  MG per tablet, , Disp: , Rfl:   •  Insulin Glargine (BASAGLAR KWIKPEN) 100 UNIT/ML injection pen, , Disp: , Rfl:   •  Lactobacillus (Acidophilus) 100 MG capsule, Take 350 mg by mouth., Disp: , Rfl:   •  magnesium oxide (MAGOX) 400 (241.3 Mg) MG tablet tablet, , Disp: , Rfl:   •  methocarbamol (ROBAXIN) 500 MG tablet, , Disp: , Rfl:   •  methocarbamol (ROBAXIN) 750 MG tablet, , Disp: , Rfl:   •  metoprolol tartrate (LOPRESSOR) 100 MG tablet, , Disp: , Rfl:   •  omeprazole (priLOSEC) 40 MG capsule, , Disp: , Rfl:   •  pregabalin (LYRICA) 150 MG capsule, , Disp: , Rfl:   •  progesterone (PROMETRIUM) 100 MG capsule, , Disp: , Rfl:   •  simvastatin (ZOCOR) 40 MG tablet, , Disp: , Rfl:   •  triamcinolone (KENALOG) 0.025 % cream, Apply  topically to the appropriate area as directed 2 (Two) Times a Day., Disp: , Rfl:   •  Trulicity 0.75 MG/0.5ML solution pen-injector, , Disp: , Rfl:     Allergies:   Allergies   Allergen Reactions   • Bactrim [Sulfamethoxazole-Trimethoprim] Hives   • Flavoxate Hives   • Lisinopril Swelling   • Phenazopyridine Hives       The following portions of the patient's history were reviewed and updated as appropriate: allergies, current medications, past family history, past medical history, past social history, past surgical history and  "problem list.        Objective    Objective      Vital Signs:   Vitals:    08/30/22 1309   BP: 138/74   Weight: 107 kg (235 lb 14.3 oz)   Height: 175.3 cm (69.02\")       Ortho Exam:  Left shoulder does still have some diminished internal rotation and findings consistent with glenohumeral joint contracture.  She does have excellent rotator cuff strength.  She has more generalized pain radiating pain emanating from the neck.  Appears to be more of a pain generator from neurogenic origin    Results Review:  Imaging Results (Last 24 Hours)     ** No results found for the last 24 hours. **        Outside hospital CT scan [facility] completed 8/24/2022.  Mild degenerative changes at the glenohumeral joint.  AC joint arthritic changes.  No obvious rotator cuff pathology.    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  No indication for left shoulder surgery at this time.  Additionally we had the prior CT arthrogram which did not show obvious rotator cuff pathology at that time.  Counseled that she may need additional neck work-up/neurogenic " work-up.  Her primary care team did obtain a CT scan of her neck and the patient notes that they may laquita additional neck work-up which may be beneficial given her description of radiating pain down the arm.  I do think there is still hope that the contracted component of the shoulder can still improve and that surgery would not be recommended, PT can help to continue to work on stretching.    Follow Up: As needed      Juan Coker MD, FAAOS  Orthopedic Surgeon  Fellowship Trained Shoulder and Elbow Surgeon  [facility]  Orthopedics and Sports Medicine  [address] [place] [address], [address]  [place] [address]    08/30/22  18:06 EDT

## 2022-11-02 ENCOUNTER — TRANSCRIBE ORDERS (OUTPATIENT)
Dept: ADMINISTRATIVE | Facility: HOSPITAL | Age: 62
End: 2022-11-02

## 2022-11-02 DIAGNOSIS — Z12.31 VISIT FOR SCREENING MAMMOGRAM: Primary | ICD-10-CM

## 2022-12-12 ENCOUNTER — HOSPITAL ENCOUNTER (OUTPATIENT)
Dept: MAMMOGRAPHY | Facility: HOSPITAL | Age: 62
Discharge: HOME | End: 2022-12-12
Admitting: NURSE PRACTITIONER

## 2022-12-12 DIAGNOSIS — Z12.31 VISIT FOR SCREENING MAMMOGRAM: ICD-10-CM

## 2022-12-12 PROCEDURE — 77063 BREAST TOMOSYNTHESIS BI: CPT

## 2022-12-12 PROCEDURE — 77063 BREAST TOMOSYNTHESIS BI: CPT | Performed by: RADIOLOGY

## 2022-12-12 PROCEDURE — 77067 SCR MAMMO BI INCL CAD: CPT

## 2022-12-12 PROCEDURE — 77067 SCR MAMMO BI INCL CAD: CPT | Performed by: RADIOLOGY

## 2023-11-01 ENCOUNTER — TRANSCRIBE ORDERS (OUTPATIENT)
Dept: ADMINISTRATIVE | Facility: HOSPITAL | Age: 63
End: 2023-11-01
Payer: COMMERCIAL

## 2023-11-01 DIAGNOSIS — Z12.31 VISIT FOR SCREENING MAMMOGRAM: Primary | ICD-10-CM

## 2024-02-14 ENCOUNTER — HOSPITAL ENCOUNTER (OUTPATIENT)
Dept: MAMMOGRAPHY | Facility: HOSPITAL | Age: 64
Discharge: HOME | End: 2024-02-14
Admitting: NURSE PRACTITIONER
Payer: COMMERCIAL

## 2024-02-14 DIAGNOSIS — Z12.31 VISIT FOR SCREENING MAMMOGRAM: ICD-10-CM

## 2024-02-14 PROCEDURE — 77067 SCR MAMMO BI INCL CAD: CPT

## 2024-02-14 PROCEDURE — 77063 BREAST TOMOSYNTHESIS BI: CPT

## 2024-02-15 PROCEDURE — 77067 SCR MAMMO BI INCL CAD: CPT | Performed by: RADIOLOGY

## 2024-02-15 PROCEDURE — 77063 BREAST TOMOSYNTHESIS BI: CPT | Performed by: RADIOLOGY
